# Patient Record
(demographics unavailable — no encounter records)

---

## 2024-12-05 NOTE — HISTORY OF PRESENT ILLNESS
[Home] : at home, [unfilled] , at the time of the visit. [Medical Office: ([facility])___] : at the medical office located in  [Family Member] : family member [Formal Caregiver] : formal caregiver [Verbal consent obtained from patient] : the patient, [unfilled] [FreeTextEntry8] : 85 yo HTN CVA, S/P embolization R ophth aneurysm L aneurysm x 2 Had PT/OT still require assistance for all ADL Has limited mobility. She is unable to move her body on her own to prevent pressure ulcers due to post CVA R hemiparesis. She has both bowel and bladder incontinence, requires total care for bathing, clothing, feeding, and has altered sensory perception (legally blind in her OS, her OD is increasingly getting blurry). She is home bound and uses Hoya lift to transfer from bed to wheelchair. She has a high risk of fall. Under her right buttock she has superficial skin peeling. The visiting nurse is using 40% Zn Oxide on this area twice daily  Need [place] for job  For Care

## 2024-12-05 NOTE — PHYSICAL EXAM
[Well-Appearing] : well-appearing [No Respiratory Distress] : no respiratory distress  [de-identified] : circular skin breakdown below R buttock. Looks indurated and may be larger if indurated area debrided.  from picture from email

## 2024-12-05 NOTE — ASSESSMENT
[FreeTextEntry1] : 85 yo woman with bowel and bladder incontinence S/P CVA with R hemiparesis unable to reposition in bed without assistance, now with Stage 2 R buttock ulcer treated with Zn Oxide by visiting nurse. It is imperative that measure be taken to prevent future decubital ulcers and to prevent advancement of current R below buttock Stage 2 ulcer. Ulcer may be higher stage if indurated area in the middle of skin breakdown is debrided. Stage 2 or Greater Decubital Ulcer  Visiting RN and daughter to continue turning patient and monitor of R buttock ulcer

## 2024-12-05 NOTE — PHYSICAL EXAM
[Well-Appearing] : well-appearing [No Respiratory Distress] : no respiratory distress  [de-identified] : circular skin breakdown below R buttock. Looks indurated and may be larger if indurated area debrided.  from picture from email

## 2024-12-05 NOTE — HISTORY OF PRESENT ILLNESS
[Home] : at home, [unfilled] , at the time of the visit. [Medical Office: ([facility])___] : at the medical office located in  [Family Member] : family member [Formal Caregiver] : formal caregiver [Verbal consent obtained from patient] : the patient, [unfilled] [FreeTextEntry8] : 85 yo HTN CVA, S/P embolization R ophth aneurysm L aneurysm x 2 Had PT/OT still require assistance for all ADL Has limited mobility. She is unable to move her body on her own to prevent pressure ulcers due to post CVA R hemiparesis. She has both bowel and bladder incontinence, requires total care for bathing, clothing, feeding, and has altered sensory perception (legally blind in her OS, her OD is increasingly getting blurry). She is home bound and uses Hoya lift to transfer from bed to wheelchair. She has a high risk of fall. Under her right buttock she has superficial skin peeling. The visiting nurse is using 40% Zn Oxide on this area twice daily  Need [place] for job  For Care